# Patient Record
Sex: FEMALE | Race: WHITE
[De-identification: names, ages, dates, MRNs, and addresses within clinical notes are randomized per-mention and may not be internally consistent; named-entity substitution may affect disease eponyms.]

---

## 2020-09-17 ENCOUNTER — HOSPITAL ENCOUNTER (EMERGENCY)
Dept: HOSPITAL 62 - ER | Age: 20
LOS: 1 days | Discharge: ANOTHER HOSPITAL | End: 2020-09-18
Payer: COMMERCIAL

## 2020-09-17 DIAGNOSIS — R19.09: Primary | ICD-10-CM

## 2020-09-17 DIAGNOSIS — R11.2: ICD-10-CM

## 2020-09-17 DIAGNOSIS — R10.31: ICD-10-CM

## 2020-09-17 PROCEDURE — 81001 URINALYSIS AUTO W/SCOPE: CPT

## 2020-09-17 PROCEDURE — 83690 ASSAY OF LIPASE: CPT

## 2020-09-17 PROCEDURE — 96361 HYDRATE IV INFUSION ADD-ON: CPT

## 2020-09-17 PROCEDURE — 93976 VASCULAR STUDY: CPT

## 2020-09-17 PROCEDURE — 84703 CHORIONIC GONADOTROPIN ASSAY: CPT

## 2020-09-17 PROCEDURE — 96365 THER/PROPH/DIAG IV INF INIT: CPT

## 2020-09-17 PROCEDURE — 80053 COMPREHEN METABOLIC PANEL: CPT

## 2020-09-17 PROCEDURE — 96375 TX/PRO/DX INJ NEW DRUG ADDON: CPT

## 2020-09-17 PROCEDURE — 36415 COLL VENOUS BLD VENIPUNCTURE: CPT

## 2020-09-17 PROCEDURE — 99285 EMERGENCY DEPT VISIT HI MDM: CPT

## 2020-09-17 PROCEDURE — 76830 TRANSVAGINAL US NON-OB: CPT

## 2020-09-17 PROCEDURE — 74177 CT ABD & PELVIS W/CONTRAST: CPT

## 2020-09-17 PROCEDURE — 85025 COMPLETE CBC W/AUTO DIFF WBC: CPT

## 2020-09-17 PROCEDURE — 96376 TX/PRO/DX INJ SAME DRUG ADON: CPT

## 2020-09-18 VITALS — SYSTOLIC BLOOD PRESSURE: 114 MMHG | DIASTOLIC BLOOD PRESSURE: 69 MMHG

## 2020-09-18 LAB
ADD MANUAL DIFF: NO
ALBUMIN SERPL-MCNC: 4.5 G/DL (ref 3.5–5)
ALP SERPL-CCNC: 72 U/L (ref 38–126)
ANION GAP SERPL CALC-SCNC: 12 MMOL/L (ref 5–19)
APPEARANCE UR: (no result)
APTT PPP: YELLOW S
AST SERPL-CCNC: 26 U/L (ref 14–36)
BASOPHILS # BLD AUTO: 0 10^3/UL (ref 0–0.2)
BASOPHILS NFR BLD AUTO: 0.3 % (ref 0–2)
BILIRUB DIRECT SERPL-MCNC: 0.3 MG/DL (ref 0–0.4)
BILIRUB SERPL-MCNC: 0.8 MG/DL (ref 0.2–1.3)
BILIRUB UR QL STRIP: NEGATIVE
BUN SERPL-MCNC: 8 MG/DL (ref 7–20)
CALCIUM: 9.6 MG/DL (ref 8.4–10.2)
CHLORIDE SERPL-SCNC: 104 MMOL/L (ref 98–107)
CO2 SERPL-SCNC: 21 MMOL/L (ref 22–30)
EOSINOPHIL # BLD AUTO: 0 10^3/UL (ref 0–0.6)
EOSINOPHIL NFR BLD AUTO: 0.1 % (ref 0–6)
ERYTHROCYTE [DISTWIDTH] IN BLOOD BY AUTOMATED COUNT: 13.9 % (ref 11.5–14)
GLUCOSE SERPL-MCNC: 125 MG/DL (ref 75–110)
GLUCOSE UR STRIP-MCNC: NEGATIVE MG/DL
HCT VFR BLD CALC: 38.9 % (ref 36–47)
HGB BLD-MCNC: 13.5 G/DL (ref 12–15.5)
KETONES UR STRIP-MCNC: 20 MG/DL
LYMPHOCYTES # BLD AUTO: 0.7 10^3/UL (ref 0.5–4.7)
LYMPHOCYTES NFR BLD AUTO: 6.8 % (ref 13–45)
MCH RBC QN AUTO: 28.5 PG (ref 27–33.4)
MCHC RBC AUTO-ENTMCNC: 34.7 G/DL (ref 32–36)
MCV RBC AUTO: 82 FL (ref 80–97)
MONOCYTES # BLD AUTO: 0.3 10^3/UL (ref 0.1–1.4)
MONOCYTES NFR BLD AUTO: 3 % (ref 3–13)
NEUTROPHILS # BLD AUTO: 9.7 10^3/UL (ref 1.7–8.2)
NEUTS SEG NFR BLD AUTO: 89.8 % (ref 42–78)
NITRITE UR QL STRIP: NEGATIVE
PH UR STRIP: 5 [PH] (ref 5–9)
PLATELET # BLD: 297 10^3/UL (ref 150–450)
POTASSIUM SERPL-SCNC: 4.1 MMOL/L (ref 3.6–5)
PROT SERPL-MCNC: 7.8 G/DL (ref 6.3–8.2)
PROT UR STRIP-MCNC: NEGATIVE MG/DL
RBC # BLD AUTO: 4.73 10^6/UL (ref 3.72–5.28)
SP GR UR STRIP: 1.01
TOTAL CELLS COUNTED % (AUTO): 100 %
UROBILINOGEN UR-MCNC: NEGATIVE MG/DL (ref ?–2)
WBC # BLD AUTO: 10.8 10^3/UL (ref 4–10.5)

## 2020-09-18 NOTE — ER DOCUMENT REPORT
ED Medical Screen (RME)





- General


Chief Complaint: Abdominal Pain


Stated Complaint: ABDOMINAL PAIN


Time Seen by Provider: 09/18/20 00:03


Primary Care Provider: 


JOSE WILKES MD [Primary Care Provider] - Follow up as needed


Mode of Arrival: Medic


Information source: Patient


Notes: 





Patient presents with right lower quadrant pain that started yesterday.  Patient

states that pain resolved and then came back again today.  Patient reports 

nausea vomiting x3 episodes.  Patient is currently on her menstrual cycle.  

Patient denies any urinary symptoms.  Patient denies any significant past 

medical history.





I have greeted and performed a rapid initial assessment of this patient.  A 

comprehensive ED assessment and evaluation of the patient, analysis of test 

results and completion of the medical decision making process will be conducted 

by additional ED providers.





- Related Data


Allergies/Adverse Reactions: 


                                        





No Known Allergies Allergy (Unverified 07/04/12 17:32)


   











Past Medical History





- Immunizations


Immunizations up to date: Yes





Physical Exam





- Vital signs


Vitals: 





                                        











Temp Pulse Resp BP Pulse Ox


 


 97.6 F   108 H  20   113/74   99 


 


 09/17/20 23:31  09/17/20 23:31  09/17/20 23:31  09/17/20 23:31  09/17/20 23:31














- Abdominal


Inspection: Morbidly Obese


Tenderness: Tender - Right lower quadrant





Course





- Vital Signs


Vital signs: 





                                        











Temp Pulse Resp BP Pulse Ox


 


 97.6 F   108 H  20   113/74   99 


 


 09/17/20 23:31  09/17/20 23:31  09/17/20 23:31  09/17/20 23:31  09/17/20 23:31














Doctor's Discharge





- Discharge


Referrals: 


JOSE WILKES MD [Primary Care Provider] - Follow up as needed

## 2020-09-18 NOTE — RADIOLOGY REPORT (SQ)
EXAM DESCRIPTION:  CT ABD/PELVIS WITH IV ONLY



IMAGES COMPLETED DATE/TIME:  9/18/2020 9:59 am



REASON FOR STUDY:  RLQ pain



COMPARISON:  Pelvic ultrasound from 9/18/2020.



TECHNIQUE:  CT scan of the abdomen and pelvis performed using helical scanning technique with dynamic
 intravenous contrast injection.  No oral contrast. Images reviewed with lung, soft tissue, and bone 
windows. Reconstructed coronal and sagittal MPR images reviewed. Delayed images for evaluation of the
 urinary system also acquired. All images stored on PACS.

All CT scanners at this facility use dose modulation, iterative reconstruction, and/or weight based d
osing when appropriate to reduce radiation dose to as low as reasonably achievable (ALARA).

[facility]: Dose Right  [facility]: CareDose    [facility]: Dose Right    [facility]: Teradose 4D    [facility]: Smart Technologies



CONTRAST TYPE AND DOSE:  Contrast/concentration: Isovue 350.00 mmol/ml; Total Contrast Delivered: 100
.0 ml; Total Saline Delivered: 42.0 ml



RENAL FUNCTION:  GFR > 60.



RADIATION DOSE:  CT Rad equipment meets quality standard of care and radiation dose reduction techniq
ues were employed. CTDIvol: 17.1 - 20.1 mGy. DLP: 2139 mGy-cm.



LIMITATIONS:  None.



FINDINGS:  LOWER CHEST: No acute findings.

LIVER: The morphology of the liver is noncirrhotic.  The portal veins are patent.  There is no hepati
c mass.

SPLEEN: No splenomegaly or splenic mass.

PANCREAS: No acute gross abnormality of the pancreas.

GALLBLADDER: No abnormality that is apparent on CT.

ADRENAL GLANDS: No mass or asymmetry.

RIGHT KIDNEY AND URETER: No solid mass, hydronephrosis, nephrolithiasis, hydroureter or ureterolithia
sis.

LEFT KIDNEY AND URETER: No solid mass, hydronephrosis, nephrolithiasis, hydroureter or ureterolithias
is.

AORTA AND VESSELS: No aneurysm or dissection of the abdominal aorta.

RETROPERITONEUM: No retroperitoneal adenopathy, hemorrhage or mass.

BOWEL AND PERITONEAL CAVITY: No bowel obstruction, bowel wall thickening or pericolonic/ perienteric 
inflammation.  No mesenteric adenopathy, free intraperitoneal fluid or mesenteric/ omental inflammati
on.

APPENDIX: Normal.

PELVIS: There is a complex cystic lesion in the mid pelvis that measures 10.4 cm in AP diameter, 10 c
m in craniocaudal diameter and 12.8 cm in craniocaudal diameter ; the lesion contains eccentric bulky
 calcification and likely represents the complex cystic lesion described on the correlative ultrasoun
d.  There is no abnormality of the uterus or left adnexum that is apparent on CT.  The urinary bladde
r is partially distended.

ABDOMINAL WALL: No mass or hernia.

BONES: No acute findings.

OTHER: No other finding.



IMPRESSION:  Complex cystic lesion in the mid pelvis that measures 10.4 cm in AP diameter, 10 cm in c
raniocaudal diameter and 12.8 cm in craniocaudal diameter ; the lesion contains eccentric ulcer calci
fications likely represents the complex cystic lesion described on the correlative ultrasound.  The f
avored differential consideration is an ovarian dermoid cyst.



TECHNICAL DOCUMENTATION:  JOB ID:  [id number]

Quality ID # 436: Final reports with documentation of one or more dose reduction techniques (e.g., Au
tomated exposure control, adjustment of the mA and/or kV according to patient size, use of iterative 
reconstruction technique)

 2011 [employer]- All Rights Reserved



Reading location - IP/workstation name: [email or web address]

## 2020-09-18 NOTE — ER DOCUMENT REPORT
ED GI/





- General


Chief Complaint: Abdominal Pain


Stated Complaint: ABDOMINAL PAIN


Time Seen by Provider: 09/18/20 00:03


Primary Care Provider: 


JOSE WILKES MD [PEDIATRICS] - Follow up as needed


Mode of Arrival: Medic


Information source: Patient


Notes: 





Otherwise healthy 20-year-old female presenting to the emergency department 

chief complaint of severe right lower quadrant pain that began yesterday.  She 

states pain went away on its own and then returned abruptly.  She reports nausea

 with vomiting.  Denies any fever, reports chills.  She is currently on her 

menstrual cycle.  She does report having a similar pain 4 months ago, she was 

seen by a provider at student services at her college, she states they did not 

do anything at that time.  She denies any urinary symptoms or abnormal vaginal 

discharge.








- Related Data


Allergies/Adverse Reactions: 


                                        





No Known Allergies Allergy (Unverified 07/04/12 17:32)


   











Past Medical History





- General


Information source: Patient





- Social History


Smoking Status: Never Smoker


Frequency of alcohol use: None


Drug Abuse: None


Family History: CAD, DM, Other - mother has fibroids





- Medical History


Medical History: Negative


Surgical Hx: Negative





- Immunizations


Immunizations up to date: Yes





Review of Systems





- Review of Systems


Constitutional: No symptoms reported


EENT: No symptoms reported


Cardiovascular: No symptoms reported


Respiratory: No symptoms reported


Gastrointestinal: Abdominal pain, Nausea, Vomiting


Genitourinary: No symptoms reported


Female Genitourinary: No symptoms reported


Musculoskeletal: No symptoms reported


Skin: No symptoms reported


Hematologic/Lymphatic: No symptoms reported


Neurological/Psychological: No symptoms reported





Physical Exam





- Vital signs


Vitals: 


                                        











Temp Pulse Resp BP Pulse Ox


 


 97.6 F   108 H  20   113/74   99 


 


 09/17/20 23:31  09/17/20 23:31  09/17/20 23:31  09/17/20 23:31  09/17/20 23:31














- Notes


Notes: 





PHYSICAL EXAMINATION:





GENERAL: Well-appearing, well-nourished and in acute distress.





HEAD: Atraumatic, normocephalic.





EYES: Pupils equal round and reactive to light, extraocular movements intact, 

conjunctiva are normal.





ENT: Moist mucous membranes.





NECK: Normal range of motion.





LUNGS: Breath sounds clear to auscultation bilaterally and equal.  No wheezes 

rales or rhonchi.





HEART: Regular rate and rhythm without murmurs





ABDOMEN: Soft, nondistended abdomen.  No masses appreciated.  Tenderness in the 

right lower quadrant.





Female : Unable to perform due to patient's pain.





Musculoskeletal: Normal range of motion, no pitting or edema.  No cyanosis.





NEUROLOGICAL: Cranial nerves grossly intact.  Normal speech. 





PSYCH: Normal mood, normal affect.





SKIN: Warm, Dry, normal turgor, no rashes or lesions noted.





Course





- Re-evaluation


Re-evalutation: 





09/18/20 09:50


Spoke with ongoing on-call radiologist, Dr. Warner, he states there is blood flow

 to the periphery of the right ovary however he does recommend patient having a 

CT of the abdomen pelvis, patient has already had this ordered, she is getting 

this done now.





09/18/20 10:00


Consulted OB/GYN on call, spoke with Dr. Draper.  She will review patient's 

images.





09/18/20 10:21


Patient continues to have 5/5 pain.  She has received fentanyl, morphine and 

Dilaudid.  We will give her a dose of IV acetaminophen.  Awaiting return call 

from OB/GYN.  Likely patient will need transfer to a tertiary facility.





09/18/20 11:07


Dr. Draper came to the bedside and evaluated the patient.  She states that she 

is able to perform the surgery however we do not have gynecology/oncology 

available here and that the patient and mother were informed that if we do the 

surgery here to remove the right ovary she could likely require a second surgery

 at another time.





09/18/20 11:10


Called [facility] transfer center to initiate transfer.





09/18/20 11:42


Spoke with Dr. Rose Marie GYN/ONC at [facility].  She feels it would

 be most appropriate if this patient was accepted by gynecology, the transfer 

center states they have no beds, they are on regional diversion, they are not 

taking a wait list at this time.





09/18/20 12:30


Call [facility] to initiate transfer.





09/18/20 12:57


Call placed to [facility]





09/18/20 13:23


Awaiting return call from both [facility] and [facility].  

Nursing staff came and reported to me that patient's mother is asking why the 

surgery cannot be done here.  I went to the room to discuss with the mother and 

the patient that Dr. Draper the OB GYN on-call here said the surgery can be 

done here however we do not have the oncology portion in case there is any 

abnormality such as that found.  Initially mother and patient requested transfer

 to a tertiary facility for continuity of care as they will have all specialists

 available if needed.  Patient and mother now continue to want to be 

transferred, they do not want to have surgery here.





09/18/20 13:47


Call placed to [facility] to inquire about status of 

transfer.  The transfer center informs me they are still waiting for the physic

daron to return the page.  Patient's pain is controlled now, she states it is 0/5.





09/18/20 14:05


[facility] returned phone call, I spoke with Dr. Balbir Echevarria on call OBGYN at 

[facility].  He states that the patient should have a diagnostic laparostomy at our 

facility to ensure no ovarian torsion although there is blood flow on the 

ultrasound.





09/18/20 14:10


[facility] return phone call, I spoke with Dr. Juarez Aragon at [facility], OBGYN on-

call.  He has accepted the patient for transfer, this will be an ER to ER 

transfer.





09/18/20 14:40


Patient and patient's mother updated on plan of care at the bedside.  Patient 

currently denies any pain.  I was able to palpate her abdomen as we were unable 

to earlier secondary to pain as she was sitting up.  She does have some 

tenderness with palpation in the right lower quadrant.  Transport should be here

 in 1 hour to transport her to [facility].





09/18/20 15:50


Transport is at the bedside patient stable for transfer to [facility] at this 

time.











- Vital Signs


Vital signs: 


                                        











Temp Pulse Resp BP Pulse Ox


 


 98.4 F   67   16   114/69   100 


 


 09/18/20 15:48  09/18/20 15:48  09/18/20 15:48  09/18/20 15:48  09/18/20 15:48














- Laboratory


Result Diagrams: 


                                 09/18/20 03:37





                                 09/18/20 03:37


Laboratory results interpreted by me: 


                                        











  09/18/20 09/18/20 09/18/20





  03:37 03:37 08:59


 


WBC  10.8 H  


 


Lymph % (Auto)  6.8 L  


 


Absolute Neuts (auto)  9.7 H  


 


Seg Neutrophils %  89.8 H  


 


Carbon Dioxide   21 L 


 


Glucose   125 H 


 


Urine Ketones    20 H


 


Urine Blood    LARGE H


 


Ur Leukocyte Esterase    TRACE H














Discharge





- Discharge


Clinical Impression: 


 Adnexal mass





Condition: Stable


Disposition: [facility]


Referrals: 


JOSE WILKES MD [PEDIATRICS] - Follow up as needed

## 2020-09-18 NOTE — RADIOLOGY REPORT (SQ)
Ultrasound of the pelvis: 9/18/2020 2:28 AM CDT



HISTORY: 20-year-old patient with right lower quadrant pelvic

pain.



TECHNIQUE: Multiple grayscale and color Doppler images of the

pelvis were obtained transabdominally and transvaginally.



COMPARISON: None available



FINDINGS: The uterus measures 7.6 x 5.0 x 3.9 cm. The endometrium

measures 3-4 mm in thickness. 



No myometrial mass is seen. 



The right adnexa measures 12.2 x 11.3 x 9.6 cm. There is a

hypoechoic area within the right ovary which demonstrate some

septations. No abnormal mural nodularity is seen. Some venous

waveforms were obtained from the periphery. The left ovary

measures 2.1 x 1.7 x 1.6 cm. Normal arterial waveforms were

obtained from the left ovary.



No free intraperitoneal fluid is seen within the posterior

cul-de-sac. 



IMPRESSION:

There is a hypoechoic area at the right adnexa which demonstrates

some septations. This may represent an ovarian or adnexal

neoplasm such as a cystadenoma or cystadenocarcinoma. A

hydrosalpinx is felt to be less likely.